# Patient Record
(demographics unavailable — no encounter records)

---

## 2025-01-10 NOTE — ASSESSMENT
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 Consequently, I renewed Gilenya QD and reordered modafinil 200mg bid. We will see her in follow-up in 3-4 months for reevaluation. Presumably, the vascular ischemia will be remedied within that period of time.     Entered by Lizett Ca acting as scribe for Dr. Terry.     The documentation recorded by the scribe, in my presence, accurately reflects the service I personally performed, and the decisions made by me with my edits as appropriate. Zeke Terry MD, FAAN, FACP Diplomate [employer].

## 2025-01-10 NOTE — PHYSICAL EXAM
[General Appearance - Alert] : alert [General Appearance - In No Acute Distress] : in no acute distress [General Appearance - Well Nourished] : well nourished [General Appearance - Well Developed] : well developed [General Appearance - Well-Appearing] : healthy appearing [Oriented To Time, Place, And Person] : oriented to person, place, and time [Affect] : the affect was normal [Mood] : the mood was normal [Memory Recent] : recent memory was not impaired [Memory Remote] : remote memory was not impaired [Person] : oriented to person [Place] : oriented to place [Time] : oriented to time [Short Term Intact] : short term memory intact [Remote Intact] : remote memory intact [Registration Intact] : recent registration memory intact [Cranial Nerves Optic (II)] : visual acuity intact bilaterally,  visual fields full to confrontation, pupils equal round and reactive to light [Cranial Nerves Oculomotor (III)] : extraocular motion intact [Cranial Nerves Facial (VII)] : face symmetrical [Cranial Nerves Accessory (XI - Cranial And Spinal)] : head turning and shoulder shrug symmetric [Motor Tone] : muscle tone was normal in all four extremities [Motor Strength] : muscle strength was normal in all four extremities [Involuntary Movements] : no involuntary movements were seen [FreeTextEntry1] : involuntary movements of entire body

## 2025-04-25 NOTE — HISTORY OF PRESENT ILLNESS
[FreeTextEntry1] : I had the pleasure of Ms. Angelia Turner today in follow up.   Her previous history and physical findings have been reviewed.   She is under our care for relapsing-remitting MS. She remains on a regimen of Gilenya 0.5 mg daily, gabapentin 300 mg TID and baclofen 10 mg prn for spasms. She states this regimen continues to keep her MS stable without adverse side effects.  She does also use Modafanil 200 mg BID which promotes wakefulness as the MS makes her tired. MRI of the brain did not show any new demyelinating plaques or lesions and no contrast enhancing lesions. Findings were similar to previous MRIs. The patient told me that she was found to have significant vascular ischemic disease in the major vessels of the right LEs. She states her father also has that and he was also a smoker just like the patient. Shes had a follow-up with vascular surgery and a consideration of major vascular occlusive disease is being evaluated. Since this is not primarily a neurological complaint, I did not have any significant contribution to make to the resolution of the vascular insufficiency in the right LEs. She indicated that there's definitely decreased blood flow through the arteries in the right leg but the MRI of the brain did not show any worsening of her MS changes. Patient has had no new MS related symptoms. She thinks she may have some slight numbness on the top of her left foot but that is questionable. She did not have to have a stent in the artery in the right leg. It was apparently too narrow to operate on.    Patient expressed new concerns regarding dizziness, vision changes and difficulty with balance and coordination, she also notes chronic involuntary movements from long term use of Mertazepine which is prescribed by her psychiatrist. 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 She previously saw Neurosurgery for her neck at which time her MRI was reviewed, and she was advised to f/u with pain management. She did have an MRA of the cervical vessels which the neuroradiologist suggested decreased flow through the left vertebral artery. 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 The left vertebral artery is not visualized either due to congenital absence of the left vertebral artery or occlusion or stenosis.    In addition, patient complained that for the past three months she's been having numbness in the right lower leg from the knee down to the foot. Initially she thought it might be an injury, but it has persisted. We ordered MRI of the lumbar spine and EMG of the lower extremities. MRI of the lumbar spine revealed moderately severe spinal stenosis at L4-5 which has progressed since February 2016. EMG was abnormal at L5 on the right.    Patient also complains of pain in the clavicles and biceps muscles bilaterally without trauma or injury. I suggested this might be an orthopedic issue and referred her there for evaluation.

## 2025-04-25 NOTE — ASSESSMENT
[FreeTextEntry1] : Impression is of chronic multiple sclerosis. Since there are no new neurological symptoms and the patient is continuing gilenya 0.5mg QD and she has enough prescriptions, she does not need a renewal at the present time. We will see her in follow-up in 6 months.     Entered by Lizett Ca acting as scribe for Dr. Terry.     The documentation recorded by the scribe, in my presence, accurately reflects the service I personally performed, and the decisions made by me with my edits as appropriate. Zeke Terry MD, FAAN, FACP Diplomate [employer].